# Patient Record
Sex: MALE | Race: WHITE | NOT HISPANIC OR LATINO | Employment: OTHER | URBAN - METROPOLITAN AREA
[De-identification: names, ages, dates, MRNs, and addresses within clinical notes are randomized per-mention and may not be internally consistent; named-entity substitution may affect disease eponyms.]

---

## 2021-09-21 ENCOUNTER — TELEPHONE (OUTPATIENT)
Dept: GASTROENTEROLOGY | Facility: CLINIC | Age: 66
End: 2021-09-21

## 2021-09-21 NOTE — TELEPHONE ENCOUNTER
09/21/21  Screened by: Nancy Richardson    Referring Provider   Pre- Screening: There is no height or weight on file to calculate BMI  Has patient been referred for a routine screening Colonoscopy? no  Is the patient between 39-70 years old? yes      Previous Colonoscopy yes   If yes:    Date: 09/28/2016    Facility: [facility] – [place], [place]    Reason: hx polyps      SCHEDULING STAFF: If the patient is between 39yrs-47yrs, please advise patient to confirm benefits/coverage with their insurance company for a routine screening colonoscopy, some insurance carriers will only cover at [address] or older  If the patient is over 66years old, please schedule an office visit  Does the patient want to see a Gastroenterologist prior to their procedure OR are they having any GI symptoms? no    Has the patient been hospitalized or had abdominal surgery in the past 6 months? no    Does the patient use supplemental oxygen? no    Does the patient take Coumadin, Lovenox, Plavix, Elliquis, Xarelto, or other blood thinning medication? no    Has the patient had a stroke, cardiac event, or stent placed in the past year? no    SCHEDULING STAFF: If patient answers NO to above questions, then schedule procedure  If patient answers YES to above questions, then schedule office appointment  If patient is between 45yrs - 49yrs, please advise patient that we will have to confirm benefits & coverage with their insurance company for a routine screening colonoscopy

## 2021-09-30 ENCOUNTER — TELEPHONE (OUTPATIENT)
Dept: GASTROENTEROLOGY | Facility: CLINIC | Age: 66
End: 2021-09-30

## 2021-09-30 VITALS — HEIGHT: 71 IN | WEIGHT: 255 LBS | BODY MASS INDEX: 35.7 KG/M2

## 2021-09-30 RX ORDER — LISINOPRIL AND HYDROCHLOROTHIAZIDE 20; 12.5 MG/1; MG/1
1 TABLET ORAL 2 TIMES DAILY
COMMUNITY

## 2021-10-06 ENCOUNTER — ANESTHESIA EVENT (OUTPATIENT)
Dept: GASTROENTEROLOGY | Facility: AMBULATORY SURGERY CENTER | Age: 66
End: 2021-10-06

## 2021-10-06 ENCOUNTER — ANESTHESIA (OUTPATIENT)
Dept: GASTROENTEROLOGY | Facility: AMBULATORY SURGERY CENTER | Age: 66
End: 2021-10-06

## 2021-10-06 ENCOUNTER — HOSPITAL ENCOUNTER (OUTPATIENT)
Dept: GASTROENTEROLOGY | Facility: AMBULATORY SURGERY CENTER | Age: 66
Discharge: HOME | End: 2021-10-06
Payer: MEDICARE

## 2021-10-06 VITALS
HEART RATE: 74 BPM | SYSTOLIC BLOOD PRESSURE: 112 MMHG | TEMPERATURE: 99.3 F | OXYGEN SATURATION: 94 % | RESPIRATION RATE: 20 BRPM | DIASTOLIC BLOOD PRESSURE: 70 MMHG

## 2021-10-06 DIAGNOSIS — Z86.010 HISTORY OF COLON POLYPS: ICD-10-CM

## 2021-10-06 PROCEDURE — 45385 COLONOSCOPY W/LESION REMOVAL: CPT | Performed by: INTERNAL MEDICINE

## 2021-10-06 PROCEDURE — 88305 TISSUE EXAM BY PATHOLOGIST: CPT | Performed by: PATHOLOGY

## 2021-10-06 RX ORDER — PROPOFOL 10 MG/ML
INJECTION, EMULSION INTRAVENOUS CONTINUOUS PRN
Status: DISCONTINUED | OUTPATIENT
Start: 2021-10-06 | End: 2021-10-06

## 2021-10-06 RX ORDER — PROPOFOL 10 MG/ML
INJECTION, EMULSION INTRAVENOUS AS NEEDED
Status: DISCONTINUED | OUTPATIENT
Start: 2021-10-06 | End: 2021-10-06

## 2021-10-06 RX ORDER — SODIUM CHLORIDE, SODIUM LACTATE, POTASSIUM CHLORIDE, CALCIUM CHLORIDE 600; 310; 30; 20 MG/100ML; MG/100ML; MG/100ML; MG/100ML
50 INJECTION, SOLUTION INTRAVENOUS CONTINUOUS
Status: DISCONTINUED | OUTPATIENT
Start: 2021-10-06 | End: 2021-10-06

## 2021-10-06 RX ORDER — LIDOCAINE HYDROCHLORIDE 10 MG/ML
INJECTION, SOLUTION EPIDURAL; INFILTRATION; INTRACAUDAL; PERINEURAL AS NEEDED
Status: DISCONTINUED | OUTPATIENT
Start: 2021-10-06 | End: 2021-10-06

## 2021-10-06 RX ADMIN — PROPOFOL 50 MG: 10 INJECTION, EMULSION INTRAVENOUS at 12:18

## 2021-10-06 RX ADMIN — PROPOFOL 140 MCG/KG/MIN: 10 INJECTION, EMULSION INTRAVENOUS at 12:16

## 2021-10-06 RX ADMIN — SODIUM CHLORIDE, SODIUM LACTATE, POTASSIUM CHLORIDE, CALCIUM CHLORIDE 50 ML/HR: 600; 310; 30; 20 INJECTION, SOLUTION INTRAVENOUS at 11:09

## 2021-10-06 RX ADMIN — LIDOCAINE HYDROCHLORIDE 100 ML: 10 INJECTION, SOLUTION EPIDURAL; INFILTRATION; INTRACAUDAL; PERINEURAL at 12:16

## 2021-10-06 RX ADMIN — PROPOFOL 100 MG: 10 INJECTION, EMULSION INTRAVENOUS at 12:16

## 2024-10-28 ENCOUNTER — EVALUATION (OUTPATIENT)
Dept: PHYSICAL THERAPY | Facility: CLINIC | Age: 69
End: 2024-10-28
Payer: MEDICARE

## 2024-10-28 DIAGNOSIS — M26.623 BILATERAL TEMPOROMANDIBULAR JOINT PAIN: Primary | ICD-10-CM

## 2024-10-28 PROCEDURE — 97161 PT EVAL LOW COMPLEX 20 MIN: CPT | Performed by: PHYSICAL THERAPIST

## 2024-10-28 PROCEDURE — 97112 NEUROMUSCULAR REEDUCATION: CPT | Performed by: PHYSICAL THERAPIST

## 2024-10-28 PROCEDURE — 97140 MANUAL THERAPY 1/> REGIONS: CPT | Performed by: PHYSICAL THERAPIST

## 2024-10-28 NOTE — LETTER
2024    Kenn Fry DMD  [address] [facility] [address]    Patient: Sanju Miranda   YOB: 1955   Date of Visit: 10/28/2024     Encounter Diagnosis     ICD-10-CM    1. Bilateral temporomandibular joint pain  M26.623           Dear Dr. Fry:    Thank you for your recent referral of Sanju Miranda. Please review the attached evaluation summary from Sanju's recent visit.     Please verify that you agree with the plan of care by signing the attached order.     If you have any questions or concerns, please do not hesitate to call.     I sincerely appreciate the opportunity to share in the care of one of your patients and hope to have another opportunity to work with you in the near future.       Sincerely,    Bairon Clements, PT      Referring Provider:      I certify that I have read the below Plan of Care and certify the need for these services furnished under this plan of treatment while under my care.                    Kenn Fry DMD  [address] [facility] [address] [address]  Via Fax: [phone]          PT Evaluation     Today's date: 10/28/2024  Patient name: Sanju Miranda  : 1955  MRN: [id number]  Referring provider: Kenn Fry DMD  Dx:   Encounter Diagnosis     ICD-10-CM    1. Bilateral temporomandibular joint pain  M26.623                      Assessment  Impairments: abnormal or restricted ROM, activity intolerance, impaired physical strength and pain with function  Symptom irritability: low    Assessment details: Sanju Miranda is a 69 y.o. male presenting to outpatient physical therapy at [facility] with complaints of b/l jaw pain, stiffness and weakness.  He presents with decreased range of motion, decreased strength, limited flexibility, poor postural awareness, poor body mechanics, decreased tolerance to activity and decreased functional mobility due to Bilateral temporomandibular joint pain  (primary encounter diagnosis).  She would benefit from skilled PT services  in order to address these deficits and reach maximum level of function.  Thank you for the referral!  Understanding of Dx/Px/POC: good     Prognosis: good    Goals  STG  1. Independent with HEP in 2 weeks  2. Decrease pain at worst by 50% in 2 weeks    LTG  1. Increase postural strength in all planes to 5/5 in 4 weeks  2. 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     HEP ACCESS CODE:     FOTO Completed On: 10/28/2024    POC Expires Reeval for Medicare to be completed  Unit LImit Auth Expiration Date PT/OT/STVisit Limit   12/1/2024 By visit 10 na na na    Completed on visit    "                 Auth Status DATE 10/28        NA Visit # 1         Remaining         MANUAL THERAPY         STM to b/l masseter & temporalis mm NS                                                     THERAPEUTIC EXERCISE HEP                                                                                                                                                               NEUROMUSCULAR REEDUCATION           Mandibular depression isometrics 10/28 10x10\"                                                                                                                                          THERAPEUTIC ACTIVITY          Patient education: pathoanatomy, nature of sxs, POC, HEP  NS                                      GAIT TRAINING                                                  MODALITIES         Heat pre tx prn                                                "

## 2024-10-28 NOTE — PROGRESS NOTES
PT Evaluation     Today's date: 10/28/2024  Patient name: Sanju Miranda  : 1955  MRN: [id number]  Referring provider: Kenn Fry DMD  Dx:   Encounter Diagnosis     ICD-10-CM    1. Bilateral temporomandibular joint pain  M26.623                      Assessment  Impairments: abnormal or restricted ROM, activity intolerance, impaired physical strength and pain with function  Symptom irritability: low    Assessment details: Sanju Miranad is a 69 y.o. male presenting to outpatient physical therapy at [facility] with complaints of b/l jaw pain, stiffness and weakness.  He presents with decreased range of motion, decreased strength, limited flexibility, poor postural awareness, poor body mechanics, decreased tolerance to activity and decreased functional mobility due to Bilateral temporomandibular joint pain  (primary encounter diagnosis).  She would benefit from skilled PT services in order to address these deficits and reach maximum level of function.  Thank you for the referral!  Understanding of Dx/Px/POC: good     Prognosis: good    Goals  STG  1. Independent with HEP in 2 weeks  2. Decrease pain at worst by 50% in 2 weeks    LTG  1. Increase postural strength in all planes to 5/5 in 4 weeks  2. Return to full, pain-free and unrestricted chewing and talking in 4 weeks        Plan  Patient would benefit from: skilled physical therapy  Planned modality interventions: thermotherapy: hydrocollator packs    Planned therapy interventions: manual therapy, neuromuscular re-education, therapeutic activities and therapeutic exercise    Frequency: 2x week  Duration in weeks: 4  Treatment plan discussed with: patient    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  Patient Goals  Patient goals for therapy: decreased pain, increased motion, increased strength and independence with ADLs/IADLs    Pain  At worst  "pain rating: 10  Quality: dull ache and throbbing  Relieving factors: medications, ice and heat  Progression: improved      Objective     Strength/Myotome Testing     Left Shoulder     Isolated Muscles   Lower trapezius: 4   Middle trapezius: 4     Right Shoulder     Isolated Muscles   Lower trapezius: 4   Middle trapezius: 4   Upper trapezius: 4+   TMJ   Dental findings: + Muscle bulk abnormality b/l masseter mm.  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     HEP ACCESS CODE:     FOTO Completed On: 10/28/2024    POC Expires Reeval for Medicare to be completed  Unit LImit Auth Expiration Date PT/OT/STVisit Limit   12/1/2024 By visit 10 na na na    Completed on visit                    Auth Status DATE 10/28        NA Visit # 1         Remaining         MANUAL THERAPY         STM to b/l masseter & temporalis mm NS                                                     THERAPEUTIC EXERCISE HEP                                                                                                                                                               NEUROMUSCULAR REEDUCATION           Mandibular depression isometrics 10/28 10x10\"                                                                                                                                          THERAPEUTIC ACTIVITY          Patient education: pathoanatomy, nature of sxs, POC, HEP  NS                                      GAIT TRAINING                                                  MODALITIES         Heat pre tx prn                                "

## 2024-11-01 ENCOUNTER — OFFICE VISIT (OUTPATIENT)
Dept: PHYSICAL THERAPY | Facility: CLINIC | Age: 69
End: 2024-11-01
Payer: MEDICARE

## 2024-11-01 DIAGNOSIS — M26.623 BILATERAL TEMPOROMANDIBULAR JOINT PAIN: Primary | ICD-10-CM

## 2024-11-01 PROCEDURE — 97112 NEUROMUSCULAR REEDUCATION: CPT | Performed by: PHYSICAL THERAPIST

## 2024-11-01 PROCEDURE — 97140 MANUAL THERAPY 1/> REGIONS: CPT | Performed by: PHYSICAL THERAPIST

## 2024-11-01 NOTE — PROGRESS NOTES
"Daily Note     Today's date: 2024  Patient name: Sanju Miranda  : 1955  MRN: [id number]  Referring provider: Kenn Fry DMD  Dx:   Encounter Diagnosis     ICD-10-CM    1. Bilateral temporomandibular joint pain  M26.623           Subjective: feeling more sensation in b/l muscles of mastication        Objective: See treatment diary below      Assessment: Tolerated treatment well with initiation of full treatment program as noted below requiring verbal and tactile cues from PT for safe execution of therapeutic exercise. Patient demonstrated fatigue post treatment, exhibited good technique with therapeutic exercises, and would benefit from continued PT      Plan: Progress treatment as tolerated.       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     HEP ACCESS CODE:     FOTO Completed On: 10/28/2024    POC Expires Reeval for Medicare to be completed  Unit LImit Auth Expiration Date PT/OT/STVisit Limit   2024 By visit 10 na na na    Completed on visit                    Auth Status DATE 10/28 11/1       NA Visit # 1 2        Remaining 9 8       MANUAL THERAPY         STM to b/l masseter & temporalis mm NS NS                                                    THERAPEUTIC EXERCISE HEP                                                                                                                                                               NEUROMUSCULAR REEDUCATION           Mandibular depression isometrics 10/28 10x10\" 20x5\"                                                                                                                                          THERAPEUTIC ACTIVITY          Patient education: pathoanatomy, nature of sxs, POC, HEP  NS NS                                     GAIT TRAINING                                                  MODALITIES         Heat pre tx prn                               "

## 2024-11-12 ENCOUNTER — OFFICE VISIT (OUTPATIENT)
Dept: PHYSICAL THERAPY | Facility: CLINIC | Age: 69
End: 2024-11-12
Payer: MEDICARE

## 2024-11-12 DIAGNOSIS — M26.623 BILATERAL TEMPOROMANDIBULAR JOINT PAIN: Primary | ICD-10-CM

## 2024-11-12 PROCEDURE — 97140 MANUAL THERAPY 1/> REGIONS: CPT | Performed by: PHYSICAL THERAPIST

## 2024-11-12 PROCEDURE — 97110 THERAPEUTIC EXERCISES: CPT | Performed by: PHYSICAL THERAPIST

## 2024-11-12 PROCEDURE — 97112 NEUROMUSCULAR REEDUCATION: CPT | Performed by: PHYSICAL THERAPIST

## 2024-11-12 NOTE — PROGRESS NOTES
"Daily Note     Today's date: 2024  Patient name: Sanju Miranda  : 1955  MRN: [id number]  Referring provider: Kenn Fry DMD  Dx:   Encounter Diagnosis     ICD-10-CM    1. Bilateral temporomandibular joint pain  M26.623           Subjective: Compliant with HEP, no questions regarding POC, motivated to continue PT    Objective: See treatment diary below      Assessment: Tolerated treatment well with progression of treatment program as noted below requiring verbal and tactile cues from PT for safe execution of therapeutic exercise. Patient demonstrated fatigue post treatment, exhibited good technique with therapeutic exercises, and would benefit from continued PT      Plan: Progress treatment as tolerated.       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     HEP ACCESS CODE:     FOTO Completed On: 10/28/2024    POC Expires Reeval for Medicare to be completed  Unit LImit Auth Expiration Date PT/OT/STVisit Limit   2024 By visit 10 na na BOMN    Completed on visit                    Auth Status DATE 10/28 11/1 11/12      NA Visit # 1 2 3       Remaining 9 8 7      MANUAL THERAPY         STM to b/l masseter & temporalis mm NS NS NS                                                   THERAPEUTIC EXERCISE HEP         Cervical flexion stretch    10x10\"      B/L UT & LS stretch    10x10\" ea                                                                                                                                        NEUROMUSCULAR REEDUCATION           Mandibular depression isometrics 10/28 10x10\" 20x5\"  20x10\"      Mid row iso    3x10 Purple TB 2\"      Shld ext iso    3x10 BTB 2\"                                                                                                                    THERAPEUTIC ACTIVITY          Patient education: pathoanatomy, nature of sxs, POC, HEP  NS NS NS                                    GAIT TRAINING               "                                    MODALITIES         Heat pre tx prn

## 2024-11-14 ENCOUNTER — OFFICE VISIT (OUTPATIENT)
Dept: PHYSICAL THERAPY | Facility: CLINIC | Age: 69
End: 2024-11-14
Payer: MEDICARE

## 2024-11-14 DIAGNOSIS — M26.623 BILATERAL TEMPOROMANDIBULAR JOINT PAIN: Primary | ICD-10-CM

## 2024-11-14 PROCEDURE — 97140 MANUAL THERAPY 1/> REGIONS: CPT | Performed by: PHYSICAL THERAPIST

## 2024-11-14 PROCEDURE — 97112 NEUROMUSCULAR REEDUCATION: CPT | Performed by: PHYSICAL THERAPIST

## 2024-11-14 PROCEDURE — 97110 THERAPEUTIC EXERCISES: CPT | Performed by: PHYSICAL THERAPIST

## 2024-11-14 NOTE — PROGRESS NOTES
"Daily Note     Today's date: 2024  Patient name: Sanju Miranda  : 1955  MRN: [id number]  Referring provider: Kenn Fry DMD  Dx:   Encounter Diagnosis     ICD-10-CM    1. Bilateral temporomandibular joint pain  M26.623             Subjective: Compliant with HEP, no questions regarding POC, motivated to continue PT    Objective: See treatment diary below      Assessment: Tolerated treatment well with progression of treatment program as noted below requiring verbal and tactile cues from PT for safe execution of therapeutic exercise. Patient demonstrated fatigue post treatment, exhibited good technique with therapeutic exercises, and would benefit from continued PT      Plan: Progress treatment as tolerated.       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     HEP ACCESS CODE:     FOTO Completed On: 10/28/2024    POC Expires Reeval for Medicare to be completed  Unit LImit Auth Expiration Date PT/OT/STVisit Limit   2024 By visit 10 na na BOMN    Completed on visit                    Auth Status DATE 10/28 11/1 11/12 11/14     NA Visit # 1 2 3 4      Remaining 9 8 7 6     MANUAL THERAPY         STM to b/l masseter & temporalis mm NS NS NS NS                                                  THERAPEUTIC EXERCISE HEP         Cervical flexion stretch    10x10\" 10x10\"      B/L UT & LS stretch    10x10\" ea 10x10\" ea                                                                                                                                       NEUROMUSCULAR REEDUCATION           Mandibular depression isometrics 10/28 10x10\" 20x5\"  20x10\" 20x10\"      Mid row iso    3x10 Purple TB 2\" 3x10 purple TB 2\"      Shld ext iso    3x10 BTB 2\" 3x10 BTB 2\"                                                                                                                    THERAPEUTIC ACTIVITY          Patient education: pathoanatomy, nature of sxs, POC, HEP  NS NS " NS NS                                   GAIT TRAINING                                                  MODALITIES         Heat pre tx prn

## 2024-11-15 ENCOUNTER — OFFICE VISIT (OUTPATIENT)
Dept: PHYSICAL THERAPY | Facility: CLINIC | Age: 69
End: 2024-11-15
Payer: MEDICARE

## 2024-11-15 DIAGNOSIS — M26.623 BILATERAL TEMPOROMANDIBULAR JOINT PAIN: Primary | ICD-10-CM

## 2024-11-15 PROCEDURE — 97110 THERAPEUTIC EXERCISES: CPT | Performed by: PHYSICAL THERAPIST

## 2024-11-15 PROCEDURE — 97140 MANUAL THERAPY 1/> REGIONS: CPT | Performed by: PHYSICAL THERAPIST

## 2024-11-15 NOTE — PROGRESS NOTES
"Daily Note     Today's date: 11/15/2024  Patient name: Sanju Miranda  : 1955  MRN: [id number]  Referring provider: Kenn Fry DMD  Dx:   Encounter Diagnosis     ICD-10-CM    1. Bilateral temporomandibular joint pain  M26.623             Subjective: Compliant with HEP, no questions regarding POC, motivated to continue PT    Objective: See treatment diary below      Assessment: Tolerated treatment well with progression of treatment program as noted below requiring verbal and tactile cues from PT for safe execution of therapeutic exercise. Patient demonstrated fatigue post treatment, exhibited good technique with therapeutic exercises, and would benefit from continued PT      Plan: Progress treatment as tolerated.       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     HEP ACCESS CODE:     FOTO Completed On: 10/28/2024    POC Expires Reeval for Medicare to be completed  Unit LImit Auth Expiration Date PT/OT/STVisit Limit   2024 By visit 10 na na BOMN    Completed on visit                    Auth Status DATE 10/28 11/1 11/12 11/14 11/15    NA Visit # 1 2 3 4 5     Remaining 9 8 7 6 5    MANUAL THERAPY         STM to b/l masseter & temporalis mm NS NS NS NS NS                                                 THERAPEUTIC EXERCISE HEP         Cervical flexion stretch    10x10\" 10x10\"  10x10\"    B/L UT & LS stretch    10x10\" ea 10x10\" ea 10x10\" ea                                                                                                                                      NEUROMUSCULAR REEDUCATION           Mandibular depression isometrics 10/28 10x10\" 20x5\"  20x10\" 20x10\"  20x10\"    Mid row iso    3x10 Purple TB 2\" 3x10 purple TB 2\"  3x10 purple TB 2\"    Shld ext iso    3x10 BTB 2\" 3x10 BTB 2\"  3x10 BTB 2\"                                                                                                                   THERAPEUTIC ACTIVITY        "   Patient education: pathoanatomy, nature of sxs, POC, HEP  NS NS NS NS NS                                  GAIT TRAINING                                                  MODALITIES         Heat pre tx prn

## 2024-11-19 ENCOUNTER — OFFICE VISIT (OUTPATIENT)
Dept: PHYSICAL THERAPY | Facility: CLINIC | Age: 69
End: 2024-11-19
Payer: MEDICARE

## 2024-11-19 DIAGNOSIS — M26.623 BILATERAL TEMPOROMANDIBULAR JOINT PAIN: Primary | ICD-10-CM

## 2024-11-19 PROCEDURE — 97112 NEUROMUSCULAR REEDUCATION: CPT | Performed by: PHYSICAL THERAPIST

## 2024-11-19 PROCEDURE — 97140 MANUAL THERAPY 1/> REGIONS: CPT | Performed by: PHYSICAL THERAPIST

## 2024-11-19 PROCEDURE — 97110 THERAPEUTIC EXERCISES: CPT | Performed by: PHYSICAL THERAPIST

## 2024-11-19 NOTE — PROGRESS NOTES
"Daily Note     Today's date: 2024  Patient name: Sanju Miranda  : 1955  MRN: [id number]  Referring provider: Kenn Fyr DMD  Dx:   Encounter Diagnosis     ICD-10-CM    1. Bilateral temporomandibular joint pain  M26.623             Subjective: Compliant with HEP, no questions regarding POC, motivated to continue PT    Objective: See treatment diary below      Assessment: Patient demonstrates improving bl TMJ mobility this session.  Patient completed today's session without increase in pain during NMR & TE.  Future sessions should continue to progress postural stability as able.. Patient demonstrated fatigue post treatment, exhibited good technique with therapeutic exercises, and would benefit from continued PT      Plan: Progress treatment as tolerated.       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     HEP ACCESS CODE:     FOTO Completed On: 10/28/2024    POC Expires Reeval for Medicare to be completed  Unit LImit Auth Expiration Date PT/OT/STVisit Limit   2024 By visit 10 na na BOMN    Completed on visit                    Auth Status DATE 10/28 11/1 11/12 11/14 11/15 11/19   NA Visit # 1 2 3 4 5 6    Remaining 9 8 7 6 5 4   MANUAL THERAPY         STM to b/l masseter & temporalis mm NS NS NS NS NS NS                                                THERAPEUTIC EXERCISE HEP         Cervical flexion stretch    10x10\" 10x10\"  10x10\" 10x10\"   B/L UT & LS stretch    10x10\" ea 10x10\" ea 10x10\" ea 10x10\" ea                                                                                                                                     NEUROMUSCULAR REEDUCATION           Mandibular depression isometrics 10/28 10x10\" 20x5\"  20x10\" 20x10\"  20x10\" 20x10\"   Mid row iso    3x10 Purple TB 2\" 3x10 purple TB 2\"  3x10 purple TB 2\" 3x10 purple TB2\"   Shld ext iso    3x10 BTB 2\" 3x10 BTB 2\"  3x10 BTB 2\"  3x10 BTB 2\"                                             "                                                                     THERAPEUTIC ACTIVITY          Patient education: pathoanatomy, nature of sxs, POC, HEP  NS NS NS NS NS NS                                 GAIT TRAINING                                                  MODALITIES         Heat pre tx prn

## 2024-11-21 ENCOUNTER — OFFICE VISIT (OUTPATIENT)
Dept: PHYSICAL THERAPY | Facility: CLINIC | Age: 69
End: 2024-11-21
Payer: MEDICARE

## 2024-11-21 DIAGNOSIS — M26.623 BILATERAL TEMPOROMANDIBULAR JOINT PAIN: Primary | ICD-10-CM

## 2024-11-21 PROCEDURE — 97112 NEUROMUSCULAR REEDUCATION: CPT | Performed by: PHYSICAL THERAPIST

## 2024-11-21 PROCEDURE — 97140 MANUAL THERAPY 1/> REGIONS: CPT | Performed by: PHYSICAL THERAPIST

## 2024-11-21 PROCEDURE — 97110 THERAPEUTIC EXERCISES: CPT | Performed by: PHYSICAL THERAPIST

## 2024-11-21 NOTE — PROGRESS NOTES
"Daily Note     Today's date: 2024  Patient name: Sanju Miranda  : 1955  MRN: [id number]  Referring provider: Kenn Fry DMD  Dx:   Encounter Diagnosis     ICD-10-CM    1. Bilateral temporomandibular joint pain  M26.623             Subjective: Compliant with HEP, no questions regarding POC, motivated to continue PT    Objective: See treatment diary below      Assessment: Patient demonstrates improving bl TMJ mobility this session.  Patient completed today's session without increase in pain during NMR & TE.  Future sessions should continue to progress postural stability as able.. Patient demonstrated fatigue post treatment, exhibited good technique with therapeutic exercises, and would benefit from continued PT      Plan: Progress treatment as tolerated.       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     HEP ACCESS CODE:     FOTO Completed On: 10/28/2024    POC Expires Reeval for Medicare to be completed  Unit LImit Auth Expiration Date PT/OT/STVisit Limit   2024 By visit 10 na na BOMN    Completed on visit                    Auth Status DATE 10/28 11/1 11/12 11/14 11/15 11/19 11/21   NA Visit # 1 2 3 4 5 6 7    Remaining 9 8 7 6 5 4 3   MANUAL THERAPY          STM to b/l masseter & temporalis mm NS NS NS NS NS NS NS                                                     THERAPEUTIC EXERCISE HEP          Cervical flexion stretch    10x10\" 10x10\"  10x10\" 10x10\" 10x10\"   B/L UT & LS stretch    10x10\" ea 10x10\" ea 10x10\" ea 10x10\" ea 10x10\"                                                                                                                                                   NEUROMUSCULAR REEDUCATION            Mandibular depression isometrics 10/28 10x10\" 20x5\"  20x10\" 20x10\"  20x10\" 20x10\" 30x10\"   Mid row iso    3x10 Purple TB 2\" 3x10 purple TB 2\"  3x10 purple TB 2\" 3x10 purple TB2\" 5lbs CC 3x10   Shld ext iso    3x10 BTB 2\" 3x10 BTB 2\"  " "3x10 BTB 2\"  3x10 BTB 2\" 3x10 65lbs CC                                                                                                                            THERAPEUTIC ACTIVITY           Patient education: pathoanatomy, nature of sxs, POC, HEP  NS NS NS NS NS NS                                     GAIT TRAINING                                                       MODALITIES          Heat pre tx prn                                 "

## 2024-11-22 ENCOUNTER — OFFICE VISIT (OUTPATIENT)
Dept: PHYSICAL THERAPY | Facility: CLINIC | Age: 69
End: 2024-11-22
Payer: MEDICARE

## 2024-11-22 DIAGNOSIS — M26.623 BILATERAL TEMPOROMANDIBULAR JOINT PAIN: Primary | ICD-10-CM

## 2024-11-22 PROCEDURE — 97110 THERAPEUTIC EXERCISES: CPT | Performed by: PHYSICAL THERAPIST

## 2024-11-22 PROCEDURE — 97112 NEUROMUSCULAR REEDUCATION: CPT | Performed by: PHYSICAL THERAPIST

## 2024-11-22 PROCEDURE — 97140 MANUAL THERAPY 1/> REGIONS: CPT | Performed by: PHYSICAL THERAPIST

## 2024-11-22 NOTE — PROGRESS NOTES
"Daily Note     Today's date: 2024  Patient name: Sanju Miranda  : 1955  MRN: [id number]  Referring provider: Kenn Fry DMD  Dx:   Encounter Diagnosis     ICD-10-CM    1. Bilateral temporomandibular joint pain  M26.623             Subjective: Compliant with HEP, no questions regarding POC, motivated to continue PT    Objective: See treatment diary below      Assessment: Patient demonstrates improving bl TMJ mobility this session.  Patient completed today's session without increase in pain during NMR & TE.  Future sessions should continue to progress postural stability as able. Patient demonstrated fatigue post treatment, exhibited good technique with therapeutic exercises, and would benefit from continued PT      Plan: Progress treatment as tolerated.       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     FOTO Completed On: 10/28/2024    POC Expires Reeval for Medicare to be completed  Unit LImit Auth Expiration Date PT/OT/STVisit Limit   2024 By visit 10 na na BOMN    Completed on visit                    Auth Status DATE 10/28 11/1 11/12 11/14 11/15 11/19 11/21 11/22   NA Visit # 1 2 3 4 5 6 7 8    Remaining 9 8 7 6 5 4 3 2   MANUAL THERAPY           STM to b/l masseter & temporalis mm NS NS NS NS NS NS NS NS                                                          THERAPEUTIC EXERCISE HEP           Cervical flexion stretch    10x10\" 10x10\"  10x10\" 10x10\" 10x10\" 10x10\"   B/L UT & LS stretch    10x10\" ea 10x10\" ea 10x10\" ea 10x10\" ea 10x10\"  10x10\"   Doorway pec stretch         10x10\"                                                                                                                                                   NEUROMUSCULAR REEDUCATION             Mandibular depression isometrics 10/28 10x10\" 20x5\"  20x10\" 20x10\"  20x10\" 20x10\" 30x10\" 30x10\"   Mid row iso    3x10 Purple TB 2\" 3x10 purple TB 2\"  3x10 purple TB 2\" 3x10 purple " "TB2\" 55lbs CC 3x10 55lbs CC 3x10   Shld ext iso    3x10 BTB 2\" 3x10 BTB 2\"  3x10 BTB 2\"  3x10 BTB 2\" 3x10 65lbs CC 3x10 65lbs CC                                                                                                                                       THERAPEUTIC ACTIVITY            Patient education: pathoanatomy, nature of sxs, POC, HEP  NS NS NS NS NS NS NS NS                                       GAIT TRAINING                                                            MODALITIES           Heat pre tx prn                                   "

## 2024-11-26 ENCOUNTER — OFFICE VISIT (OUTPATIENT)
Dept: PHYSICAL THERAPY | Facility: CLINIC | Age: 69
End: 2024-11-26
Payer: MEDICARE

## 2024-11-26 DIAGNOSIS — M26.623 BILATERAL TEMPOROMANDIBULAR JOINT PAIN: Primary | ICD-10-CM

## 2024-11-26 PROCEDURE — 97110 THERAPEUTIC EXERCISES: CPT | Performed by: PHYSICAL THERAPIST

## 2024-11-26 PROCEDURE — 97140 MANUAL THERAPY 1/> REGIONS: CPT | Performed by: PHYSICAL THERAPIST

## 2024-11-26 NOTE — PROGRESS NOTES
"Daily Note     Today's date: 2024  Patient name: Sanju Miranda  : 1955  MRN: [id number]  Referring provider: Kenn Fry DMD  Dx:   Encounter Diagnosis     ICD-10-CM    1. Bilateral temporomandibular joint pain  M26.623             Subjective: Compliant with HEP, no questions regarding POC, motivated to continue PT    Objective: See treatment diary below      Assessment: Patient demonstrates improving bl TMJ mobility this session.  Patient completed today's session without increase in pain during NMR & TE.  Future sessions should continue to progress postural stability as able. Patient demonstrated fatigue post treatment, exhibited good technique with therapeutic exercises, and would benefit from continued PT      Plan: Progress treatment as tolerated.       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     FOTO Completed On: 10/28/2024    POC Expires Reeval for Medicare to be completed  Unit LImit Auth Expiration Date PT/OT/STVisit Limit   2024 By visit 10 na na BOMN    Completed on visit                    Auth Status DATE    NA Visit # 9    Remaining 1   MANUAL THERAPY    STM to b/l masseter & temporalis mm NS                       THERAPEUTIC EXERCISE HEP    Cervical flexion stretch  10x10\"   B/L UT & LS stretch  10x10\"   Doorway pec stretch  10x10\"                                                               NEUROMUSCULAR REEDUCATION      Mandibular depression isometrics 10/28 30x10\"   Mid row iso  55lbs CC 3x10   Shld ext iso  3x10 65lbs CC                                                          THERAPEUTIC ACTIVITY     Patient education: pathoanatomy, nature of sxs, POC, HEP  NS                  GAIT TRAINING                         MODALITIES    Heat pre tx prn                     "

## 2024-11-27 ENCOUNTER — OFFICE VISIT (OUTPATIENT)
Dept: PHYSICAL THERAPY | Facility: CLINIC | Age: 69
End: 2024-11-27
Payer: MEDICARE

## 2024-11-27 DIAGNOSIS — M26.623 BILATERAL TEMPOROMANDIBULAR JOINT PAIN: Primary | ICD-10-CM

## 2024-11-27 PROCEDURE — 97140 MANUAL THERAPY 1/> REGIONS: CPT | Performed by: PHYSICAL THERAPIST

## 2024-11-27 PROCEDURE — 97110 THERAPEUTIC EXERCISES: CPT | Performed by: PHYSICAL THERAPIST

## 2024-11-27 NOTE — LETTER
2024    Kenn Fry DMD  [address] [place] [id number]    Patient: Sanju Miranda   YOB: 1955   Date of Visit: 2024     Encounter Diagnosis     ICD-10-CM    1. Bilateral temporomandibular joint pain  M26.623           Dear Dr. Fry:    Thank you for your recent referral of Sanju Miranda. Please review the attached evaluation summary from Sanju's recent visit.     Please verify that you agree with the plan of care by signing the attached order.     If you have any questions or concerns, please do not hesitate to call.     I sincerely appreciate the opportunity to share in the care of one of your patients and hope to have another opportunity to work with you in the near future.       Sincerely,    Bairon Clements, PT      Referring Provider:      I certify that I have read the below Plan of Care and certify the need for these services furnished under this plan of treatment while under my care.                    Kenn Fry DMD  [address] [place] [id number]  Via Fax: [phone]          PT Re-Evaluation     Today's date: 2024  Patient name: Sanju Miranda  : 1955  MRN: [id number]  Referring provider: Kenn Fry DMD  Dx:   Encounter Diagnosis     ICD-10-CM    1. Bilateral temporomandibular joint pain  M26.623                    RE : Sanju Miranda is a 69 y.o. male seen in outpatient physical therapy at [facility] with complaints of b/l jaw pain, stiffness and weakness.  He has been treated for decreased range of motion, decreased strength, limited flexibility, poor postural awareness, poor body mechanics, decreased tolerance to activity and decreased functional mobility due to Bilateral temporomandibular joint pain  (primary encounter diagnosis).  She would benefit from skilled PT services in order to address these deficits and reach maximum level of function.  Thank you for the referral!  Understanding of Dx/Px/POC: good     Prognosis:  good    Goals  STG  1. Independent with HEP in 2 weeks      Goal met  2. Decrease pain at worst by 50% in 2 weeks    Goal met    LTG  1. Increase postural strength in all planes to 5/5 in 4 weeks    Good progress  2. Return to full, pain-free and unrestricted chewing and talking in 4 weeks Good progress        Plan  Patient has made great progress in PT and would benefit from: continued skilled physical therapy  Planned modality interventions: thermotherapy: hydrocollator packs  Planned therapy interventions: manual therapy, neuromuscular re-education, therapeutic activities and therapeutic exercise  Frequency: 1-2x week  Duration in weeks: 4  Treatment plan discussed with: patient    Subjective Evaluation    History of Present Illness  Mechanism of injury: surgery  Mechanism of injury: B/L TMD - 7/12/2023 R, 7/24/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  Patient Goals  Patient goals for therapy: decreased pain, increased motion, increased strength and independence with ADLs/IADLs    Pain  At worst pain rating: 10       5/10  Quality: dull ache and throbbing  Relieving factors: medications, ice and heat  Progression: improved      Objective     Strength/Myotome Testing     Left Shoulder     Isolated Muscles   Lower trapezius: 4         4+  Middle trapezius: 4         4+      Right Shoulder     Isolated Muscles   Lower trapezius: 4         4+  Middle trapezius: 4         4+  Upper trapezius: 4+         5  TMJ   Dental findings: + Muscle bulk abnormality b/l masseter mm.  ROM: pain with movement  Opening (mm): 40 and right deviation   Lateral excursion, left (mm): 0  Lateral excursion, right (mm)t: 0   Protrusion (mm): 0            FOTO Completed On: 10/28/2024  Precautions: B/L TMD - 7/12/2023 R, 7/24/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     FOTO Completed On: 10/28/2024    POC Expires Reeval for Medicare to be completed  Unit LImit Auth Expiration Date PT/OT/STVisit Limit   12/27/2024 By visit 20 na na BOMN    Completed on visit 10    "                Auth Status DATE 11/27   NA Visit # 10    Remaining 0   MANUAL THERAPY    STM to b/l masseter & temporalis mm NS                       THERAPEUTIC EXERCISE HEP    Cervical flexion stretch  10x10\"   B/L UT & LS stretch  10x10\"   Doorway pec stretch  10x10\"                                                               NEUROMUSCULAR REEDUCATION      Mandibular depression isometrics 10/28 30x10\"   Mid row iso  55lbs CC 3x10   Shld ext iso  3x10 65lbs CC                                                          THERAPEUTIC ACTIVITY     Patient education: pathoanatomy, nature of sxs, POC, HEP  NS                  GAIT TRAINING                         MODALITIES    Heat pre tx prn                                     "

## 2024-11-27 NOTE — PROGRESS NOTES
PT Re-Evaluation     Today's date: 2024  Patient name: Sanju Miranda  : 1955  MRN: [id number]  Referring provider: Kenn Fry DMD  Dx:   Encounter Diagnosis     ICD-10-CM    1. Bilateral temporomandibular joint pain  M26.623                    RE : Sanju Miranda is a 69 y.o. male seen in outpatient physical therapy at [facility] with complaints of b/l jaw pain, stiffness and weakness.  He has been treated for decreased range of motion, decreased strength, limited flexibility, poor postural awareness, poor body mechanics, decreased tolerance to activity and decreased functional mobility due to Bilateral temporomandibular joint pain  (primary encounter diagnosis).  She would benefit from skilled PT services in order to address these deficits and reach maximum level of function.  Thank you for the referral!  Understanding of Dx/Px/POC: good     Prognosis: good    Goals  STG  1. Independent with HEP in 2 weeks      Goal met  2. Decrease pain at worst by 50% in 2 weeks    Goal met    LTG  1. Increase postural strength in all planes to 5/5 in 4 weeks    Good progress  2. 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  Patient Goals  Patient goals for therapy: decreased pain, increased motion, increased strength and independence with ADLs/IADLs    Pain  At worst pain rating: 10       5/10  Quality: dull ache and  "throbbing  Relieving factors: medications, ice and heat  Progression: improved      Objective     Strength/Myotome Testing     Left Shoulder     Isolated Muscles   Lower trapezius: 4         4+  Middle trapezius: 4         4+      Right Shoulder     Isolated Muscles   Lower trapezius: 4         4+  Middle trapezius: 4         4+  Upper trapezius: 4+         5  TMJ   Dental findings: + Muscle bulk abnormality b/l masseter mm.  ROM: pain with movement  Opening (mm): 40 and right deviation   Lateral excursion, left (mm): 0  Lateral excursion, right (mm)t: 0   Protrusion (mm): 0            FOTO Completed On: 10/28/2024  Precautions: B/L TMD - 7/12/2023 R, 7/24/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     FOTO Completed On: 10/28/2024    POC Expires Reeval for Medicare to be completed  Unit LImit Auth Expiration Date PT/OT/STVisit Limit   12/27/2024 By visit 20 na na BOMN    Completed on visit 10                   Auth Status DATE 11/27   NA Visit # 10    Remaining 0   MANUAL THERAPY    STM to b/l masseter & temporalis mm NS                       THERAPEUTIC EXERCISE HEP    Cervical flexion stretch  10x10\"   B/L UT & LS stretch  10x10\"   Doorway pec stretch  10x10\"                                                               NEUROMUSCULAR REEDUCATION      Mandibular depression isometrics 10/28 30x10\"   Mid row iso  55lbs CC 3x10   Shld ext iso  3x10 65lbs CC                                                          THERAPEUTIC ACTIVITY     Patient education: pathoanatomy, nature of sxs, POC, HEP  NS                  GAIT TRAINING                         MODALITIES    Heat pre tx prn                     "

## 2024-11-29 ENCOUNTER — EVALUATION (OUTPATIENT)
Dept: PHYSICAL THERAPY | Facility: CLINIC | Age: 69
End: 2024-11-29
Payer: MEDICARE

## 2024-11-29 DIAGNOSIS — M26.623 BILATERAL TEMPOROMANDIBULAR JOINT PAIN: Primary | ICD-10-CM

## 2024-11-29 PROCEDURE — 97530 THERAPEUTIC ACTIVITIES: CPT | Performed by: PHYSICAL THERAPIST

## 2024-11-29 PROCEDURE — 97140 MANUAL THERAPY 1/> REGIONS: CPT | Performed by: PHYSICAL THERAPIST

## 2024-11-29 PROCEDURE — 97110 THERAPEUTIC EXERCISES: CPT | Performed by: PHYSICAL THERAPIST

## 2024-11-29 NOTE — PROGRESS NOTES
"  Daily Note     Today's date: 2024  Patient name: Sanju Miranda  : 1955  MRN: [id number]  Referring provider: Kenn Fry DMD  Dx:   Encounter Diagnosis     ICD-10-CM    1. Bilateral temporomandibular joint pain  M26.623               Subjective: Compliant with HEP, no questions regarding POC, motivated to continue PT    Objective: See treatment diary below      Assessment: Patient demonstrates improving bl TMJ mobility this session.  Patient completed today's session without increase in pain during NMR & TE.  Future sessions should continue to progress postural stability as able. Patient demonstrated fatigue post treatment, exhibited good technique with therapeutic exercises, and would benefit from continued PT      Plan: Progress treatment as tolerated.        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     FOTO Completed On: 10/28/2024    POC Expires Reeval for Medicare to be completed  Unit LImit Auth Expiration Date PT/OT/STVisit Limit   2024 By visit 20 na na BOMN    Completed on visit 10               Auth Status DATE    NA Visit # 11    Remaining 9   MANUAL THERAPY    STM to b/l masseter & temporalis mm NS                       THERAPEUTIC EXERCISE HEP    Cervical flexion stretch  10x10\"   B/L UT & LS stretch  10x10\"   Doorway pec stretch  10x10\"                                                               NEUROMUSCULAR REEDUCATION      Mandibular depression isometrics 10/28 30x10\"   Mid row iso  55lbs CC 3x10   Shld ext iso  3x10 65lbs CC                                                          THERAPEUTIC ACTIVITY     Patient education: pathoanatomy, nature of sxs, POC, HEP  NS                  GAIT TRAINING                         MODALITIES    Heat pre tx prn                     "

## 2024-12-11 ENCOUNTER — APPOINTMENT (OUTPATIENT)
Dept: PHYSICAL THERAPY | Facility: CLINIC | Age: 69
End: 2024-12-11
Payer: MEDICARE

## 2024-12-12 ENCOUNTER — OFFICE VISIT (OUTPATIENT)
Dept: PHYSICAL THERAPY | Facility: CLINIC | Age: 69
End: 2024-12-12
Payer: MEDICARE

## 2024-12-12 DIAGNOSIS — M26.623 BILATERAL TEMPOROMANDIBULAR JOINT PAIN: Primary | ICD-10-CM

## 2024-12-12 PROCEDURE — 97110 THERAPEUTIC EXERCISES: CPT | Performed by: PHYSICAL THERAPIST

## 2024-12-12 PROCEDURE — 97140 MANUAL THERAPY 1/> REGIONS: CPT | Performed by: PHYSICAL THERAPIST

## 2024-12-12 PROCEDURE — 97530 THERAPEUTIC ACTIVITIES: CPT | Performed by: PHYSICAL THERAPIST

## 2024-12-12 NOTE — PROGRESS NOTES
"  Daily Note     Today's date: 2024  Patient name: Sanju Miranda  : 1955  MRN: [id number]  Referring provider: Kenn Fry DMD  Dx:   Encounter Diagnosis     ICD-10-CM    1. Bilateral temporomandibular joint pain  M26.623               Subjective: Compliant with HEP, no questions regarding POC, motivated to continue PT    Objective: See treatment diary below      Assessment: Patient demonstrates improving bl TMJ mobility this session.  Patient completed today's session without increase in pain during NMR & TE.  Future sessions should continue to progress postural stability as able. Patient demonstrated fatigue post treatment, exhibited good technique with therapeutic exercises, and would benefit from continued PT      Plan: Progress treatment as tolerated.        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     FOTO Completed On: 10/28/2024    POC Expires Reeval for Medicare to be completed  Unit LImit Auth Expiration Date PT/OT/STVisit Limit   2024 By visit 20 na na BOMN    Completed on visit 10               Auth Status DATE    NA Visit # 12    Remaining 8   MANUAL THERAPY    STM to b/l masseter & temporalis mm NS                       THERAPEUTIC EXERCISE HEP    Cervical flexion stretch  10x10\"   B/L UT & LS stretch  10x10\"   Doorway pec stretch  10x10\"                                                               NEUROMUSCULAR REEDUCATION      Mandibular depression isometrics 10/28 30x10\"   Mid row iso  55lbs CC 3x10   Shld ext iso  3x10 65lbs CC                                                          THERAPEUTIC ACTIVITY     Patient education: pathoanatomy, nature of sxs, POC, HEP  NS                  GAIT TRAINING                         MODALITIES    Heat pre tx prn                     "

## 2024-12-13 ENCOUNTER — APPOINTMENT (OUTPATIENT)
Dept: PHYSICAL THERAPY | Facility: CLINIC | Age: 69
End: 2024-12-13
Payer: MEDICARE

## 2024-12-17 ENCOUNTER — OFFICE VISIT (OUTPATIENT)
Dept: PHYSICAL THERAPY | Facility: CLINIC | Age: 69
End: 2024-12-17
Payer: MEDICARE

## 2024-12-17 DIAGNOSIS — M26.623 BILATERAL TEMPOROMANDIBULAR JOINT PAIN: Primary | ICD-10-CM

## 2024-12-17 PROCEDURE — 97110 THERAPEUTIC EXERCISES: CPT | Performed by: PHYSICAL THERAPIST

## 2024-12-17 PROCEDURE — 97140 MANUAL THERAPY 1/> REGIONS: CPT | Performed by: PHYSICAL THERAPIST

## 2024-12-17 PROCEDURE — 97530 THERAPEUTIC ACTIVITIES: CPT | Performed by: PHYSICAL THERAPIST

## 2024-12-17 NOTE — PROGRESS NOTES
"  Daily Note     Today's date: 2024  Patient name: Sanju Miranda  : 1955  MRN: [id number]  Referring provider: Kenn Fry DMD  Dx:   Encounter Diagnosis     ICD-10-CM    1. Bilateral temporomandibular joint pain  M26.623               Subjective: Compliant with HEP, no questions regarding POC, motivated to continue PT    Objective: See treatment diary below      Assessment: Patient demonstrates improving bl TMJ mobility this session.  Patient completed today's session without increase in pain during NMR & TE.  Future sessions should continue to progress postural stability as able. Patient demonstrated fatigue post treatment, exhibited good technique with therapeutic exercises, and would benefit from continued PT      Plan: Progress treatment as tolerated.        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     FOTO Completed On: 10/28/2024    POC Expires Reeval for Medicare to be completed  Unit LImit Auth Expiration Date PT/OT/STVisit Limit   2024 By visit 20 na na BOMN    Completed on visit 10               Auth Status DATE    NA Visit # 13    Remaining 7   MANUAL THERAPY    STM to b/l masseter & temporalis mm NS                       THERAPEUTIC EXERCISE HEP    Cervical flexion stretch  10x10\"   B/L UT & LS stretch  10x10\"   Doorway pec stretch  10x10\"                                                               NEUROMUSCULAR REEDUCATION      Mandibular depression isometrics 10/28 30x10\"   Mid row iso  55lbs CC 3x10   Shld ext iso  3x10 65lbs CC                                                          THERAPEUTIC ACTIVITY     Patient education: pathoanatomy, nature of sxs, POC, HEP  NS                  GAIT TRAINING                         MODALITIES    Heat pre tx prn                     "

## 2024-12-19 ENCOUNTER — OFFICE VISIT (OUTPATIENT)
Dept: PHYSICAL THERAPY | Facility: CLINIC | Age: 69
End: 2024-12-19
Payer: MEDICARE

## 2024-12-19 DIAGNOSIS — M26.623 BILATERAL TEMPOROMANDIBULAR JOINT PAIN: Primary | ICD-10-CM

## 2024-12-19 PROCEDURE — 97530 THERAPEUTIC ACTIVITIES: CPT | Performed by: PHYSICAL THERAPIST

## 2024-12-19 PROCEDURE — 97140 MANUAL THERAPY 1/> REGIONS: CPT | Performed by: PHYSICAL THERAPIST

## 2024-12-19 PROCEDURE — 97110 THERAPEUTIC EXERCISES: CPT | Performed by: PHYSICAL THERAPIST

## 2024-12-19 NOTE — PROGRESS NOTES
"  Daily Note     Today's date: 2024  Patient name: Sanju Miranda  : 1955  MRN: [id number]  Referring provider: Kenn Fry DMD  Dx:   Encounter Diagnosis     ICD-10-CM    1. Bilateral temporomandibular joint pain  M26.623               Subjective: Compliant with HEP, no questions regarding POC, motivated to continue PT    Objective: See treatment diary below      Assessment: Patient demonstrates improving bl TMJ mobility this session.  Patient completed today's session without increase in pain during NMR & TE.  Future sessions should continue to progress postural stability as able. Patient demonstrated fatigue post treatment, exhibited good technique with therapeutic exercises, and would benefit from continued PT      Plan: Progress treatment as tolerated.        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     FOTO Completed On: 10/28/2024    POC Expires Reeval for Medicare to be completed  Unit LImit Auth Expiration Date PT/OT/STVisit Limit   2024 By visit 20 na na BOMN    Completed on visit 10               Auth Status DATE    NA Visit # 14    Remaining 6   MANUAL THERAPY    STM to b/l masseter & temporalis mm NS                       THERAPEUTIC EXERCISE HEP    Cervical flexion stretch  10x10\"   B/L UT & LS stretch  10x10\"   Doorway pec stretch  10x10\"   Cervical rotation   20x5\" ea                                                          NEUROMUSCULAR REEDUCATION      Mandibular depression isometrics 10/28 30x10\"   Mid row iso  55lbs CC 3x10   Shld ext iso  3x10 65lbs CC                                                          THERAPEUTIC ACTIVITY     Patient education: pathoanatomy, nature of sxs, POC, HEP  NS                  GAIT TRAINING                         MODALITIES    Heat pre tx prn                     "

## 2024-12-20 ENCOUNTER — APPOINTMENT (OUTPATIENT)
Dept: PHYSICAL THERAPY | Facility: CLINIC | Age: 69
End: 2024-12-20
Payer: MEDICARE

## 2024-12-24 ENCOUNTER — APPOINTMENT (OUTPATIENT)
Dept: PHYSICAL THERAPY | Facility: CLINIC | Age: 69
End: 2024-12-24
Payer: MEDICARE

## 2024-12-26 ENCOUNTER — OFFICE VISIT (OUTPATIENT)
Dept: PHYSICAL THERAPY | Facility: CLINIC | Age: 69
End: 2024-12-26
Payer: MEDICARE

## 2024-12-26 DIAGNOSIS — M26.623 BILATERAL TEMPOROMANDIBULAR JOINT PAIN: Primary | ICD-10-CM

## 2024-12-26 PROCEDURE — 97110 THERAPEUTIC EXERCISES: CPT | Performed by: PHYSICAL THERAPIST

## 2024-12-26 PROCEDURE — 97140 MANUAL THERAPY 1/> REGIONS: CPT | Performed by: PHYSICAL THERAPIST

## 2024-12-26 NOTE — PROGRESS NOTES
"  Daily Note     Today's date: 2024  Patient name: Sanju Miranda  : 1955  MRN: [id number]  Referring provider: Kenn Fry DMD  Dx:   Encounter Diagnosis     ICD-10-CM    1. Bilateral temporomandibular joint pain  M26.623               Subjective: Compliant with HEP, no questions regarding POC, motivated to continue PT    Objective: See treatment diary below      Assessment: Patient demonstrates improving bl TMJ mobility this session.  Patient completed today's session without increase in pain during NMR & TE.  Future sessions should continue to progress postural stability as able. Patient demonstrated fatigue post treatment, exhibited good technique with therapeutic exercises, and would benefit from continued PT      Plan: Progress treatment as tolerated.        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     FOTO Completed On: 10/28/2024    POC Expires Reeval for Medicare to be completed  Unit LImit Auth Expiration Date PT/OT/STVisit Limit   2025 By visit 20 na na BOMN    Completed on visit 10               Auth Status DATE    NA Visit # 16    Remaining 4   MANUAL THERAPY    STM to b/l masseter & temporalis mm NS                       THERAPEUTIC EXERCISE HEP    Cervical flexion stretch  10x10\"   B/L UT & LS stretch  10x10\"   Doorway pec stretch  10x10\"   Cervical rotation   20x5\" ea                                                          NEUROMUSCULAR REEDUCATION      Mandibular depression isometrics 10/28 30x10\"   Mid row iso  55lbs CC 3x10   Shld ext iso  3x10 65lbs CC                                                          THERAPEUTIC ACTIVITY     Patient education: pathoanatomy, nature of sxs, POC, HEP  NS                  GAIT TRAINING                         MODALITIES    Heat pre tx prn                     "

## 2024-12-27 ENCOUNTER — APPOINTMENT (OUTPATIENT)
Dept: PHYSICAL THERAPY | Facility: CLINIC | Age: 69
End: 2024-12-27
Payer: MEDICARE

## 2024-12-31 ENCOUNTER — OFFICE VISIT (OUTPATIENT)
Dept: PHYSICAL THERAPY | Facility: CLINIC | Age: 69
End: 2024-12-31
Payer: MEDICARE

## 2024-12-31 DIAGNOSIS — M26.623 BILATERAL TEMPOROMANDIBULAR JOINT PAIN: Primary | ICD-10-CM

## 2024-12-31 PROCEDURE — 97140 MANUAL THERAPY 1/> REGIONS: CPT | Performed by: PHYSICAL THERAPIST

## 2024-12-31 PROCEDURE — 97112 NEUROMUSCULAR REEDUCATION: CPT | Performed by: PHYSICAL THERAPIST

## 2024-12-31 PROCEDURE — 97110 THERAPEUTIC EXERCISES: CPT | Performed by: PHYSICAL THERAPIST

## 2024-12-31 NOTE — PROGRESS NOTES
"  Daily Note     Today's date: 2024  Patient name: Sanju Miranda  : 1955  MRN: [id number]  Referring provider: Kenn Fry DMD  Dx:   Encounter Diagnosis     ICD-10-CM    1. Bilateral temporomandibular joint pain  M26.623               Subjective: Compliant with HEP, no questions regarding POC, motivated to continue PT    Objective: See treatment diary below      Assessment: Patient demonstrates improving bl TMJ mobility this session.  Patient completed today's session without increase in pain during NMR & TE.  Future sessions should continue to progress postural stability as able. Patient demonstrated fatigue post treatment, exhibited good technique with therapeutic exercises, and would benefit from continued PT      Plan: Progress treatment as tolerated.        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     FOTO Completed On: 10/28/2024    POC Expires Reeval for Medicare to be completed  Unit LImit Auth Expiration Date PT/OT/STVisit Limit   2025 By visit 20 na na BOMN    Completed on visit 10               Auth Status DATE    NA Visit # 17    Remaining 3   MANUAL THERAPY    STM to b/l masseter & temporalis mm NS                       THERAPEUTIC EXERCISE HEP    Cervical flexion stretch  10x10\"   B/L UT & LS stretch  10x10\"   Doorway pec stretch  10x10\"   Cervical rotation   20x5\" ea                                                          NEUROMUSCULAR REEDUCATION      Mandibular depression isometrics 10/28 30x10\"   Mid row iso  55lbs CC 3x10   Shld ext iso  3x10 65lbs CC                                                          THERAPEUTIC ACTIVITY     Patient education: pathoanatomy, nature of sxs, POC, HEP  NS                  GAIT TRAINING                         MODALITIES    Heat pre tx prn                     "

## 2025-01-02 ENCOUNTER — OFFICE VISIT (OUTPATIENT)
Dept: PHYSICAL THERAPY | Facility: CLINIC | Age: 70
End: 2025-01-02
Payer: MEDICARE

## 2025-01-02 DIAGNOSIS — M26.623 BILATERAL TEMPOROMANDIBULAR JOINT PAIN: Primary | ICD-10-CM

## 2025-01-02 PROCEDURE — 97110 THERAPEUTIC EXERCISES: CPT | Performed by: PHYSICAL THERAPIST

## 2025-01-02 PROCEDURE — 97140 MANUAL THERAPY 1/> REGIONS: CPT | Performed by: PHYSICAL THERAPIST

## 2025-01-02 NOTE — PROGRESS NOTES
"  Daily Note     Today's date: 2024  Patient name: Sanju Miranda  : 1955  MRN: [id number]  Referring provider: Kenn Fry DMD  Dx:   Encounter Diagnosis     ICD-10-CM    1. Bilateral temporomandibular joint pain  M26.623               Subjective: Compliant with HEP, no questions regarding POC, motivated to continue PT    Objective: See treatment diary below      Assessment: Patient demonstrates improving bl TMJ mobility this session.  Patient completed today's session without increase in pain during NMR & TE.  Future sessions should continue to progress postural stability as able. Patient demonstrated fatigue post treatment, exhibited good technique with therapeutic exercises, and would benefit from continued PT      Plan: Progress treatment as tolerated.        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     FOTO Completed On: 10/28/2024    POC Expires Reeval for Medicare to be completed  Unit LImit Auth Expiration Date PT/OT/STVisit Limit   2025 By visit 20 na na BOMN    Completed on visit 10               Auth Status DATE    NA Visit # 18    Remaining 2   MANUAL THERAPY    STM to b/l masseter & temporalis mm NS                       THERAPEUTIC EXERCISE HEP    Cervical flexion stretch  10x10\"   B/L UT & LS stretch  10x10\"   Doorway pec stretch  10x10\"   Cervical rotation   20x5\" ea                                                          NEUROMUSCULAR REEDUCATION      Mandibular depression isometrics 10/28 30x10\"   Mid row iso  55lbs CC 3x10   Shld ext iso  3x10 65lbs CC                                                          THERAPEUTIC ACTIVITY     Patient education: pathoanatomy, nature of sxs, POC, HEP  NS                  GAIT TRAINING                         MODALITIES    Heat pre tx prn                     "

## 2025-01-03 ENCOUNTER — APPOINTMENT (OUTPATIENT)
Dept: PHYSICAL THERAPY | Facility: CLINIC | Age: 70
End: 2025-01-03
Payer: MEDICARE

## 2025-01-07 ENCOUNTER — OFFICE VISIT (OUTPATIENT)
Dept: PHYSICAL THERAPY | Facility: CLINIC | Age: 70
End: 2025-01-07
Payer: MEDICARE

## 2025-01-07 DIAGNOSIS — M26.623 BILATERAL TEMPOROMANDIBULAR JOINT PAIN: Primary | ICD-10-CM

## 2025-01-07 PROCEDURE — 97110 THERAPEUTIC EXERCISES: CPT | Performed by: PHYSICAL THERAPIST

## 2025-01-07 PROCEDURE — 97140 MANUAL THERAPY 1/> REGIONS: CPT | Performed by: PHYSICAL THERAPIST

## 2025-01-07 NOTE — PROGRESS NOTES
"  Daily Note     Today's date: 2024  Patient name: Sanju Miranda  : 1955  MRN: [id number]  Referring provider: Kenn Fry DMD  Dx:   Encounter Diagnosis     ICD-10-CM    1. Bilateral temporomandibular joint pain  M26.623               Subjective: Compliant with HEP, no questions regarding POC, motivated to continue PT    Objective: See treatment diary below      Assessment: Patient demonstrates improving bl TMJ mobility this session.  Patient completed today's session without increase in pain during NMR & TE.  Future sessions should continue to progress postural stability as able. Patient demonstrated fatigue post treatment, exhibited good technique with therapeutic exercises, and would benefit from continued PT      Plan: Progress treatment as tolerated.        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     FOTO Completed On: 10/28/2024    POC Expires Reeval for Medicare to be completed  Unit LImit Auth Expiration Date PT/OT/STVisit Limit   2025 By visit 20 na na BOMN    Completed on visit 10               Auth Status DATE    NA Visit # 18    Remaining 2   MANUAL THERAPY    STM to b/l masseter & temporalis mm NS                       THERAPEUTIC EXERCISE HEP    Cervical flexion stretch  10x10\"   B/L UT & LS stretch  10x10\"   Doorway pec stretch  10x10\"   Cervical rotation   20x5\" ea                                                          NEUROMUSCULAR REEDUCATION      Mandibular depression isometrics 10/28 30x10\"   Mid row iso  55lbs CC 3x10   Shld ext iso  3x10 65lbs CC                                                          THERAPEUTIC ACTIVITY     Patient education: pathoanatomy, nature of sxs, POC, HEP  NS                  GAIT TRAINING                         MODALITIES    Heat pre tx prn                     "

## 2025-01-09 ENCOUNTER — OFFICE VISIT (OUTPATIENT)
Dept: PHYSICAL THERAPY | Facility: CLINIC | Age: 70
End: 2025-01-09
Payer: MEDICARE

## 2025-01-09 DIAGNOSIS — M26.623 BILATERAL TEMPOROMANDIBULAR JOINT PAIN: Primary | ICD-10-CM

## 2025-01-09 PROCEDURE — 97110 THERAPEUTIC EXERCISES: CPT | Performed by: PHYSICAL THERAPIST

## 2025-01-09 PROCEDURE — 97140 MANUAL THERAPY 1/> REGIONS: CPT | Performed by: PHYSICAL THERAPIST

## 2025-01-09 NOTE — LETTER
2025    Dewayne Cunningham MD  [address] [facility] [address]    Patient: Sanju Miranda   YOB: 1955   Date of Visit: 2025     Encounter Diagnosis     ICD-10-CM    1. Bilateral temporomandibular joint pain  M26.623           Dear Dr. Cunningham:    Thank you for your recent referral of Sanju Miranda. Please review the attached evaluation summary from Sanju's recent visit.     Please verify that you agree with the plan of care by signing the attached order.     If you have any questions or concerns, please do not hesitate to call.     I sincerely appreciate the opportunity to share in the care of one of your patients and hope to have another opportunity to work with you in the near future.       Sincerely,    Bairon Clements, PT      Referring Provider:      I certify that I have read the below Plan of Care and certify the need for these services furnished under this plan of treatment while under my care.                    Dewayne Cunningham MD  [address] [facility] [address]  Via Fax: [phone]          PT Re-Evaluation     Today's date: 2024  Patient name: Sanju Miranda  : 1955  MRN: [id number]  Referring provider: Kenn Fry DMD  Dx:   Encounter Diagnosis     ICD-10-CM    1. Bilateral temporomandibular joint pain  M26.623              RE 25: Sanju Miranda is a 69 y.o. male seen in outpatient physical therapy at [facility] with complaints of b/l jaw pain, stiffness and weakness.  He has been treated for decreased range of motion, decreased strength, limited flexibility, poor postural awareness, poor body mechanics, decreased tolerance to activity and decreased functional mobility due to Bilateral temporomandibular joint pain  (primary encounter diagnosis).  She would benefit from skilled PT services in order to address these deficits and reach maximum level of function.  Thank you for the referral!  Understanding of Dx/Px/POC: good     Prognosis: good    Goals  STG  1.  "Independent with HEP in 2 weeks      Goal met  2. Decrease pain at worst by 50% in 2 weeks    Goal met    LTG  1. Increase postural strength in all planes to 5/5 in 4 weeks    Goal met   2. Return to full, pain-free and unrestricted chewing and talking in 4 weeks Goal met        Plan  Patient has made great progress in PT and will be discharged to [facility] today.       Subjective Evaluation    History of Present Illness  Mechanism of injury: surgery  Mechanism of injury: B/L TMD - 7/12/2023 R, 7/24/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  Patient Goals  Patient goals for therapy: decreased pain, increased motion, increased strength and independence with ADLs/IADLs    Pain  At worst pain rating: 10       1/10  Quality: dull ache and throbbing  Relieving factors: medications, ice and heat  Progression: improved      Objective     Strength/Myotome Testing     Left Shoulder     Isolated Muscles   Lower trapezius: 4         5  Middle trapezius: 4         5      Right Shoulder     Isolated Muscles   Lower trapezius: 4         5  Middle trapezius: 4         5  Upper trapezius: 4+         5  TMJ   Dental findings: + Muscle bulk abnormality b/l masseter mm.  Negative for bulk abnormality  ROM: pain with movement  Opening (mm): 40 and right deviation      50 mm and no deviation   Lateral excursion, left (mm): 0  Lateral excursion, right (mm)t: 0   Protrusion (mm): 0            FOTO Completed On: 10/28/2024  Precautions: B/L TMD - 7/12/2023 R, 7/24/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     FOTO Completed On: 10/28/2024    POC Expires Reeval for Medicare to be completed  Unit LImit Auth Expiration Date PT/OT/STVisit Limit   12/27/2024 By visit 20 na na BOMN    Completed on visit 10                   Auth Status DATE 1/9   NA Visit # 19    Remaining 1   MANUAL THERAPY    STM to b/l masseter & temporalis mm NS   RE NS                   THERAPEUTIC EXERCISE HEP    Cervical flexion stretch  10x10\"   B/L UT & LS stretch  10x10\"   Doorway pec stretch  " "10x10\"                                                               NEUROMUSCULAR REEDUCATION      Mandibular depression isometrics 10/28 30x10\"   Mid row iso  55lbs CC 3x10   Shld ext iso  3x10 65lbs CC                                                          THERAPEUTIC ACTIVITY     Patient education: pathoanatomy, nature of sxs, POC, HEP  NS                  GAIT TRAINING                         MODALITIES    Heat pre tx prn                                     "

## 2025-01-09 NOTE — PROGRESS NOTES
PT Re-Evaluation     Today's date: 2024  Patient name: Sanju Miranda  : 1955  MRN: [id number]  Referring provider: Kenn Fry DMD  Dx:   Encounter Diagnosis     ICD-10-CM    1. Bilateral temporomandibular joint pain  M26.623              RE 25: Sanju Miranda is a 69 y.o. male seen in outpatient physical therapy at [facility] with complaints of b/l jaw pain, stiffness and weakness.  He has been treated for decreased range of motion, decreased strength, limited flexibility, poor postural awareness, poor body mechanics, decreased tolerance to activity and decreased functional mobility due to Bilateral temporomandibular joint pain  (primary encounter diagnosis).  She would benefit from skilled PT services in order to address these deficits and reach maximum level of function.  Thank you for the referral!  Understanding of Dx/Px/POC: good     Prognosis: good    Goals  STG  1. Independent with HEP in 2 weeks      Goal met  2. Decrease pain at worst by 50% in 2 weeks    Goal met    LTG  1. Increase postural strength in all planes to 5/5 in 4 weeks    Goal met   2. Return to full, pain-free and unrestricted chewing and talking in 4 weeks Goal met        Plan  Patient has made great progress in PT and will be discharged to [facility] today.       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  Patient Goals  Patient goals for therapy: decreased pain, increased motion, increased strength and independence with ADLs/IADLs    Pain  At worst pain rating: 10       1/10  Quality: dull ache and throbbing  Relieving factors: medications, ice and heat  Progression: improved      Objective     Strength/Myotome Testing     Left Shoulder     Isolated Muscles   Lower trapezius: 4         5  Middle trapezius: 4         5      Right Shoulder     Isolated Muscles   Lower trapezius: 4         5  Middle trapezius: 4  "        5  Upper trapezius: 4+         5  TMJ   Dental findings: + Muscle bulk abnormality b/l masseter mm.  Negative for bulk abnormality  ROM: pain with movement  Opening (mm): 40 and right deviation      50 mm and no deviation   Lateral excursion, left (mm): 0  Lateral excursion, right (mm)t: 0   Protrusion (mm): 0            FOTO Completed On: 10/28/2024  Precautions: B/L TMD - 7/12/2023 R, 7/24/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     FOTO Completed On: 10/28/2024    POC Expires Reeval for Medicare to be completed  Unit LImit Auth Expiration Date PT/OT/STVisit Limit   12/27/2024 By visit 20 na na BOMN    Completed on visit 10                   Auth Status DATE 1/9   NA Visit # 19    Remaining 1   MANUAL THERAPY    STM to b/l masseter & temporalis mm NS   RE NS                   THERAPEUTIC EXERCISE HEP    Cervical flexion stretch  10x10\"   B/L UT & LS stretch  10x10\"   Doorway pec stretch  10x10\"                                                               NEUROMUSCULAR REEDUCATION      Mandibular depression isometrics 10/28 30x10\"   Mid row iso  55lbs CC 3x10   Shld ext iso  3x10 65lbs CC                                                          THERAPEUTIC ACTIVITY     Patient education: pathoanatomy, nature of sxs, POC, HEP  NS                  GAIT TRAINING                         MODALITIES    Heat pre tx prn                     "

## 2025-01-10 ENCOUNTER — APPOINTMENT (OUTPATIENT)
Dept: PHYSICAL THERAPY | Facility: CLINIC | Age: 70
End: 2025-01-10
Payer: MEDICARE

## 2025-01-14 ENCOUNTER — APPOINTMENT (OUTPATIENT)
Dept: PHYSICAL THERAPY | Facility: CLINIC | Age: 70
End: 2025-01-14
Payer: MEDICARE

## 2025-01-16 ENCOUNTER — APPOINTMENT (OUTPATIENT)
Dept: PHYSICAL THERAPY | Facility: CLINIC | Age: 70
End: 2025-01-16
Payer: MEDICARE

## 2025-01-17 ENCOUNTER — APPOINTMENT (OUTPATIENT)
Dept: PHYSICAL THERAPY | Facility: CLINIC | Age: 70
End: 2025-01-17
Payer: MEDICARE

## 2025-01-21 ENCOUNTER — APPOINTMENT (OUTPATIENT)
Dept: PHYSICAL THERAPY | Facility: CLINIC | Age: 70
End: 2025-01-21
Payer: MEDICARE

## 2025-01-23 ENCOUNTER — APPOINTMENT (OUTPATIENT)
Dept: PHYSICAL THERAPY | Facility: CLINIC | Age: 70
End: 2025-01-23
Payer: MEDICARE

## 2025-01-24 ENCOUNTER — APPOINTMENT (OUTPATIENT)
Dept: PHYSICAL THERAPY | Facility: CLINIC | Age: 70
End: 2025-01-24
Payer: MEDICARE

## 2025-01-28 ENCOUNTER — APPOINTMENT (OUTPATIENT)
Dept: PHYSICAL THERAPY | Facility: CLINIC | Age: 70
End: 2025-01-28
Payer: MEDICARE

## 2025-01-30 ENCOUNTER — APPOINTMENT (OUTPATIENT)
Dept: PHYSICAL THERAPY | Facility: CLINIC | Age: 70
End: 2025-01-30
Payer: MEDICARE

## 2025-01-31 ENCOUNTER — APPOINTMENT (OUTPATIENT)
Dept: PHYSICAL THERAPY | Facility: CLINIC | Age: 70
End: 2025-01-31
Payer: MEDICARE